# Patient Record
Sex: MALE | Race: WHITE
[De-identification: names, ages, dates, MRNs, and addresses within clinical notes are randomized per-mention and may not be internally consistent; named-entity substitution may affect disease eponyms.]

---

## 2023-10-03 ENCOUNTER — HOSPITAL ENCOUNTER (OUTPATIENT)
Dept: HOSPITAL 46 - OPS | Age: 23
Discharge: HOME | End: 2023-10-03
Attending: SURGERY
Payer: COMMERCIAL

## 2023-10-03 VITALS — BODY MASS INDEX: 19.11 KG/M2 | HEIGHT: 72 IN | WEIGHT: 141.1 LBS

## 2023-10-03 VITALS — DIASTOLIC BLOOD PRESSURE: 67 MMHG | SYSTOLIC BLOOD PRESSURE: 110 MMHG

## 2023-10-03 VITALS — SYSTOLIC BLOOD PRESSURE: 127 MMHG | DIASTOLIC BLOOD PRESSURE: 72 MMHG

## 2023-10-03 DIAGNOSIS — R63.4: ICD-10-CM

## 2023-10-03 DIAGNOSIS — K44.9: ICD-10-CM

## 2023-10-03 DIAGNOSIS — K29.50: Primary | ICD-10-CM

## 2023-10-03 PROCEDURE — 0DBF8ZX EXCISION OF RIGHT LARGE INTESTINE, VIA NATURAL OR ARTIFICIAL OPENING ENDOSCOPIC, DIAGNOSTIC: ICD-10-PCS | Performed by: SURGERY

## 2023-10-03 PROCEDURE — 0DB98ZX EXCISION OF DUODENUM, VIA NATURAL OR ARTIFICIAL OPENING ENDOSCOPIC, DIAGNOSTIC: ICD-10-PCS | Performed by: SURGERY

## 2023-10-03 PROCEDURE — G0500 MOD SEDAT ENDO SERVICE >5YRS: HCPCS

## 2023-10-03 PROCEDURE — 0DBL8ZX EXCISION OF TRANSVERSE COLON, VIA NATURAL OR ARTIFICIAL OPENING ENDOSCOPIC, DIAGNOSTIC: ICD-10-PCS | Performed by: SURGERY

## 2023-10-03 NOTE — OR
[facility]
                                    [address], [place]  [address]
_________________________________________________________________________________________
                                                                 Signed   
 
 
DATE OF OPERATION:
10/03/2023
 
SURGEON:
Basia Del Rosario MD
 
PREOPERATIVE DIAGNOSES:
1. Chronic nausea and weight loss.
2. Negative gallbladder ultrasound and upper endoscopy showing small hiatal hernia.
 
POSTOPERATIVE DIAGNOSIS:
Normal-appearing colon and ileum.
 
PROCEDURES:
Total colonoscopy to cecum with intubation of ileum and biopsies of ileum and colon.
 
ANESTHESIA:
Intravenous sedation, fentanyl 200 mcg and Versed 10 mg (total).
 
INDICATION:
This 22-year-old white man is a patient Dr. Casarez.  He has had a number of months of
progressive weight loss for reasons that are unclear.  He has had nausea and vomiting,
not always associated with meals.  He has no family history of similar symptoms.  He has
no associated diarrhea or constipation.  A gallbladder ultrasound was performed, which
was negative.  He has been empirically treated by Dr. Casarez with PPI medication
(Prevacid), which has shown some benefit, but not complete resolution of his symptoms.
Additionally, he has noted that alcohol use has been significantly problematic and he
discontinued that as well as other dietary interventions including avoidance of soda pop
and junk food with no actual improvement. 
 
I have ordered for him to have a celiac disease panel as well as H pylori titer and
other lab studies, which he has not yet completed.  He is admitted at this time to
undergo upper endoscopy and colonoscopy to better characterize this problem and in
particular to assess for Crohn disease or other similar problem.  The risk of bleeding,
infection, and perforation related to upper endoscopy and colonoscopy were reviewed with
him.  He understands and wished to proceed. 
 
FINDINGS:
Upper endoscopy was essentially normal except for a small hiatal hernia.  The mucosa of
the esophagus was entirely normal.  CLOtest was negative. 
 
On colonoscopy, the prep was quite good.  Complete colonoscopy was undertaken to the
 
    Electronically Signed By: BASIA DEL ROSARIO MD  10/03/23 2159
_________________________________________________________________________________________
PATIENT NAME:     AURELIO DU                    
MEDICAL RECORD #: [id number]            OPERATIVE REPORT              
          ACCT #: [id number]  
DATE OF BIRTH:   [birth year]            REPORT #: [id number]      
PHYSICIAN:        BASIA DEL ROSARIO MD                 
PCP:              HANNAH CASAREZ MD                
REPORT IS CONFIDENTIAL AND NOT TO BE RELEASED WITHOUT AUTHORIZATION
 
 
                                  [facility]
                                    [address], [place]  [address]
_________________________________________________________________________________________
                                                                 Signed   
 
 
cecum.  An intubation of the ileum for at least 10 cm was accomplished as well.  There
was no evidence of terminal ileitis or Crohn disease otherwise and no sign of colitis,
neoplasm, diverticular formation, other issue. 
 
PROCEDURE IN DETAIL:
The patient was brought to the endoscopy suite and placed in lateral decubitus position
after undergoing topical lidocaine hypopharyngeal anesthesia.  He was given intravenous
sedation with full cardiopulmonary monitoring.  A bite block was placed. 
 
An Olympus video upper endoscope was passed in the hypopharynx.  Vocal cords appeared
normal.  Scope was easily advanced to the esophagus and appeared normal on first
evaluation.  The scope was passed to the stomach, which was insufflated with air.  There
was no sign of bilious fluid within the stomach.  Rugal folds were normal as was the
antrum and pylorus.  The scope was passed through the pylorus into the duodenum, which
was normal.  The scope was passed as far as possible likely encountering the proximal
jejunum and biopsies were obtained and additional biopsies of the duodenum proper.  The
scope was withdrawn into the stomach and biopsies taken of the antrum for both JOHN and
pathologic testing.  Retroflexed view was undertaken showing a small hiatal hernia, but
no other abnormality.  There was certainly no neoplasm or diffuse gastritis.  The scope
was withdrawn to the distal esophagus and although all normal in appearance, biopsies
were obtained.  Further withdrawal showed no other abnormality.  Plans were then made
for colonoscopy.  Additional sedation was given as appropriate.  Digital rectal
examination was normal.  An Olympus video colonoscope was passed in the rectum and
manipulated throughout the colon and ultimately intubating the cecum itself.  The
ileocecal valve and appendiceal orifice were normal.  With various manipulations, the
slit-like ileocecal valve was intubated.  The scope was passed several centimeters at
least 10 and possibly more showing normal ileal mucosa.  Biopsies were obtained.  The
scope was withdrawn and biopsies then taken of the right colon.  Further withdrawal
showed no other abnormality.  Biopsies were taken of the transverse colon as well as the
left colon and rectosigmoid.  Retroflexed view was normal.  The scope was removed and
the patient was taken to recovery room in good condition. 
 
CONCLUDING DIAGNOSIS:
Small hiatal hernia without signs of esophagitis.  Normal stomach and duodenum;
additionally noted normal colon and ileum. 
 
PLAN:
This may represent biliary problem without stones (acalculous cholecystitis).  I would
advocate a CCK-HIDA test be undertaken and if negative, consideration for CT scan of the
abdomen.  He will return to see me in 2 weeks after CCK-HIDA test is completed.  I
encouraged him also to conclude the tests that have been ordered including the chem
profile, CBC, celiac panel, and others. 
 
    Electronically Signed By: BASIA DEL ROSARIO MD  10/03/23 3713
_________________________________________________________________________________________
PATIENT NAME:     AURELIO DU                    
MEDICAL RECORD #: [id number]            OPERATIVE REPORT              
          ACCT #: [id number]  
DATE OF BIRTH:   [birth year]            REPORT #: [id number]      
PHYSICIAN:        BASIA DEL ROSARIO MD                 
PCP:              HANNAH CASAREZ MD                
REPORT IS CONFIDENTIAL AND NOT TO BE RELEASED WITHOUT AUTHORIZATION
 
 
                                  [facility]
                                    [address]
                                  [place], [place]  [address]
_________________________________________________________________________________________
                                                                 Signed   
 
 
 
 
 
            ________________________________________
            MD STONE Bonilla/MODL
Job #:  [id number]
DD:  10/03/2023 13:02:31
DT:  10/03/2023 18:18:01
 
cc:            Hannah Casarez MD
 
 
Copies:                                
~
 
 
 
 
 
 
 
 
 
 
 
 
 
 
 
 
 
 
 
 
 
 
 
 
 
 
    Electronically Signed By: BASIA DEL ROSARIO MD  10/03/23 2159
_________________________________________________________________________________________
PATIENT NAME:     AURELIO DU                    
MEDICAL RECORD #: [id number]            OPERATIVE REPORT              
          ACCT #: [id number]  
DATE OF BIRTH:   [birth year]            REPORT #: [id number]      
PHYSICIAN:        BASIA DEL ROSARIO MD                 
PCP:              HANNAH CASAREZ MD                
REPORT IS CONFIDENTIAL AND NOT TO BE RELEASED WITHOUT AUTHORIZATION

## 2023-10-03 NOTE — NUR
10/03/23 1259 Sheets,Prachi
1255 PT ARRIVED TO PACU ON 3L VIA NC, O2 TURNED OFF. PT AWAKE AND
TALKING TO RN. PT DENIES CONCERNS.

## 2023-10-06 NOTE — PATH
[facility]
                                    [address], [place]  [address]
_________________________________________________________________________________________
                                                                 Signed   
 
 
 
SPECIMEN(S): A DUODENAL BIOPSY
SPECIMEN(S): B ANTRUM/ANTRAL BIOPSY
SPECIMEN(S): C LOWER ESOPHAGEAL BIOPSY
SPECIMEN(S): D MIDDLE ESOPHAGEAL BIOPSY
SPECIMEN(S): E TERMINAL ILEUM BIOPSY
SPECIMEN(S): F CECUM COLON BIOPSY
SPECIMEN(S): G TRANSVERSE COLON BIOPSY
SPECIMEN(S): H DESCENDING/LEFT COLON BIOPSY
SPECIMEN(S): I RECTUM
 
SPECIMEN SOURCE:
A. DUODENAL BIOPSY
B. ANTRUM/ANTRAL BIOPSY
C. LOWER ESOPHAGEAL BIOPSY
D. MIDDLE ESOPHAGEAL BIOPSY
E. TERMINAL ILEUM BIOPSY
F. CECUM COLON BIOPSY
G. TRANSVERSE COLON BIOPSY
H. DESCENDING/LEFT COLON BIOPSY
I. RECTUM
 
CLINICAL HISTORY:
Weight loss, N/V, abdominal pain.
 
FINAL PATHOLOGIC DIAGNOSIS:
A.  Duodenal biopsy:
-  Benign duodenal mucosa, negative for specific diagnostic abnormality.
-  Preserved villous architecture, negative for increased epithelial 
lymphocytes. 
-  Benign gastric mucosa, negative for evidence of Helicobacter organisms on 
routine HE stained sections. 
B.  Antrum/antral biopsy:
-  Benign gastric mucosa with slight chronic inflammation.
-  Negative for evidence of Helicobacter organisms on routine HE stained 
sections. 
C.  Lower esophageal biopsy:
-  Benign esophageal mucosa, negative for increased epithelial eosinophils.
-  Negative for glandular mucosa.
D.  Middle esophageal biopsy:
-  Benign esophageal mucosa, negative for increased epithelia eosinophils.
E.  Terminal ileum, biopsy:
 
                                                                                    
_________________________________________________________________________________________
PATIENT NAME:     AURELIO DU MARILUZ                    
MEDICAL RECORD #: [id number]            PATHOLOGY                     
          ACCT #: [id number]       ACCESSION #: [id number]     
DATE OF BIRTH:   [birth year]            REPORT #: [id number]       
PHYSICIAN:        [facility]              
PCP:              MARY SANCHEZ MD                
REPORT IS CONFIDENTIAL AND NOT TO BE RELEASED WITHOUT AUTHORIZATION
 
 
                                  [facility]
                                    [address], [place]  [address]
_________________________________________________________________________________________
                                                                 Signed   
 
 
-  Benign small bowel type mucosa, negative for specific diagnostic 
abnormality. 
F.  Cecum colon, biopsy:
-  Benign colonic mucosa, negative for specific diagnostic abnormality.
G.  Transverse colon, biopsy:
 
-  Benign colonic mucosa, negative for specific diagnostic abnormality.
H.  Descending/left colon, biopsy:
-  Benign colonic mucosa, negative for specific diagnostic abnormality.
I.  Rectum, biopsy:
-  Benign colonic mucosa, negative for specific diagnostic abnormality.
JVR:cml
 
MICROSCOPIC EXAMINATION:
Histologic sections of all submitted blocks are examined by light microscopy.  
These findings, together with the gross examination, support the pathologic 
diagnosis. 
 
GROSS DESCRIPTION:
A. The specimen, labeled and designated "[place], duodenum biopsy," is received 
in formalin and consists of four tan soft tissue fragments, ranging from 0.2 
cm. Entirely submitted in (A1). 
B. The specimen, labeled and designated "[place], antrum biopsy," is received 
in formalin and consists of two tan soft tissue fragments, ranging from 0.2 cm. 
Entirely submitted in (B1). 
C. The specimen, labeled and designated "[place], lower esophagus biopsy," is 
received in formalin and consists of one tan soft tissue fragment, 0.4 cm. 
Entirely submitted in (C1). 
D. The specimen, labeled and designated "[place], middle esophagus biopsy," is 
received in formalin and consists of three tan soft tissue fragments, ranging 
from 0.1-0.2 cm. Entirely submitted in 
(D1).
E. The specimen, labeled and designated "[place], terminal ileum biopsy," is 
received in formalin and consists of three tan soft tissue fragments, ranging 
from 0.2-0.3 cm. Entirely submitted in (E1). 
 
F. The specimen, labeled and designated "[place], cecum biopsy," is received in 
formalin and consists of two tan soft tissue fragments, ranging from 0.2-0.3 
cm. Entirely submitted in (F1). 
G. The specimen, labeled and designated "[place], transverse colon biopsy," is 
received in formalin and consists of two tan soft tissue fragments, ranging 
from 0.2-0.3 cm. Entirely submitted in (G1). 
 
                                                                                    
_________________________________________________________________________________________
PATIENT NAME:     AURELIO DU                    
MEDICAL RECORD #: [id number]            PATHOLOGY                     
          ACCT #: [id number]       ACCESSION #: [id number]     
DATE OF BIRTH:   [birth year]            REPORT #: [id number]       
PHYSICIAN:        CUATE CASTRO              
PCP:              MARY SANCHEZ MD                
REPORT IS CONFIDENTIAL AND NOT TO BE RELEASED WITHOUT AUTHORIZATION
 
 
                                  [facility]
                                    [address], [place]  [address]
_________________________________________________________________________________________
                                                                 Signed   
 
 
H. The specimen, labeled and designated "[place], descending colon biopsy," is 
received in formalin and consists of two tan soft tissue fragments, ranging 
from 0.2-0.3 cm. Entirely submitted in (H1). 
I. The specimen, labeled and designated "[place], rectum biopsy," is received 
in formalin and consists of two tan soft tissue fragments, ranging from 0.2 cm. 
Entirely submitted in (I1). 
JS  (under the direct supervision of a pathologist)
 
The Gross Description was prepared using a voice recognition system. The report 
was reviewed for accuracy; however, sound-alike word errors, addition and/or 
deletions may occur. If there is any 
question about this report, please contact Client Services.
 
PERFORMING LABORATORY:
Technical component was performed by [facility], [address] [place] [id number] (CLIA# [id number]). Professional interpretation was 
performed by [facility], [address] [place] [phone] (CLIA#: [id number]).
 
Diagnostician:  Elmer Willoughby MD
Pathologist
Electronically Signed 10/06/2023
 
 
Copies:                                
~
 
 
 
 
 
 
 
 
 
 
 
 
 
 
 
 
 
                                                                                    
_________________________________________________________________________________________
PATIENT NAME:     AURELIO DU                    
MEDICAL RECORD #: [id number]            PATHOLOGY                     
          ACCT #: [id number]       ACCESSION #: [id number]     
DATE OF BIRTH:   [birth year]            REPORT #: [id number]       
PHYSICIAN:        CUATE CASTRO              
PCP:              MARY SANCHEZ MD                
REPORT IS CONFIDENTIAL AND NOT TO BE RELEASED WITHOUT AUTHORIZATION